# Patient Record
Sex: MALE | Race: WHITE | Employment: FULL TIME | URBAN - METROPOLITAN AREA
[De-identification: names, ages, dates, MRNs, and addresses within clinical notes are randomized per-mention and may not be internally consistent; named-entity substitution may affect disease eponyms.]

---

## 2021-04-02 ENCOUNTER — OFFICE VISIT (OUTPATIENT)
Dept: UROLOGY | Facility: CLINIC | Age: 46
End: 2021-04-02
Payer: COMMERCIAL

## 2021-04-02 VITALS
DIASTOLIC BLOOD PRESSURE: 78 MMHG | SYSTOLIC BLOOD PRESSURE: 122 MMHG | HEART RATE: 78 BPM | WEIGHT: 230 LBS | BODY MASS INDEX: 32.93 KG/M2 | HEIGHT: 70 IN

## 2021-04-02 DIAGNOSIS — R35.0 URINARY FREQUENCY: Primary | ICD-10-CM

## 2021-04-02 DIAGNOSIS — R97.20 ELEVATED PROSTATE SPECIFIC ANTIGEN (PSA): Primary | ICD-10-CM

## 2021-04-02 DIAGNOSIS — R97.20 ELEVATED PROSTATE SPECIFIC ANTIGEN (PSA): ICD-10-CM

## 2021-04-02 LAB
ALBUMIN UR-MCNC: NEGATIVE MG/DL
APPEARANCE UR: CLEAR
BILIRUB UR QL STRIP: NEGATIVE
COLOR UR AUTO: YELLOW
GLUCOSE UR STRIP-MCNC: NEGATIVE MG/DL
HGB UR QL STRIP: NEGATIVE
KETONES UR STRIP-MCNC: NEGATIVE MG/DL
LEUKOCYTE ESTERASE UR QL STRIP: NEGATIVE
NITRATE UR QL: NEGATIVE
PH UR STRIP: 6.5 PH (ref 5–7)
SOURCE: NORMAL
SP GR UR STRIP: >1.03 (ref 1–1.03)
UROBILINOGEN UR STRIP-ACNC: 0.2 EU/DL (ref 0.2–1)

## 2021-04-02 PROCEDURE — 99203 OFFICE O/P NEW LOW 30 MIN: CPT | Mod: 25 | Performed by: STUDENT IN AN ORGANIZED HEALTH CARE EDUCATION/TRAINING PROGRAM

## 2021-04-02 PROCEDURE — 51798 US URINE CAPACITY MEASURE: CPT | Performed by: STUDENT IN AN ORGANIZED HEALTH CARE EDUCATION/TRAINING PROGRAM

## 2021-04-02 PROCEDURE — 81003 URINALYSIS AUTO W/O SCOPE: CPT | Mod: QW | Performed by: STUDENT IN AN ORGANIZED HEALTH CARE EDUCATION/TRAINING PROGRAM

## 2021-04-02 RX ORDER — TAMSULOSIN HYDROCHLORIDE 0.4 MG/1
0.4 CAPSULE ORAL
COMMUNITY
Start: 2021-02-26

## 2021-04-02 RX ORDER — ALLOPURINOL 300 MG/1
600 TABLET ORAL
COMMUNITY
Start: 2021-02-15

## 2021-04-02 SDOH — HEALTH STABILITY: MENTAL HEALTH: HOW OFTEN DO YOU HAVE A DRINK CONTAINING ALCOHOL?: 4 OR MORE TIMES A WEEK

## 2021-04-02 SDOH — HEALTH STABILITY: MENTAL HEALTH: HOW OFTEN DO YOU HAVE 6 OR MORE DRINKS ON ONE OCCASION?: MONTHLY

## 2021-04-02 SDOH — HEALTH STABILITY: MENTAL HEALTH: HOW MANY STANDARD DRINKS CONTAINING ALCOHOL DO YOU HAVE ON A TYPICAL DAY?: 1 OR 2

## 2021-04-02 ASSESSMENT — PAIN SCALES - GENERAL: PAINLEVEL: NO PAIN (0)

## 2021-04-02 ASSESSMENT — MIFFLIN-ST. JEOR: SCORE: 1944.52

## 2021-04-02 NOTE — NURSING NOTE
Was having urinary frequency  And slow stream went to primary . UA was negative so alex a PSA which was elevated . Symptoms have all resolved . Never needed  The Flomax . Symptoms began about a month after his vasectomy .  Reyna Covington LPN

## 2021-04-02 NOTE — PROGRESS NOTES
[facility]  Main Office: [address]  [address]  [place] [address]       CHIEF COMPLAINT:  LUTS, elevated PSA    HISTORY:   46 yo M with urinary frequency, elevated PSA    Seen 2/23/2021 by PCP for urinary symptoms including increased urinary frequency. A month prior to that he had a vasectomy performed. A urinalysis at that time showed 11-25 wbc/hpf, 3-5 rbc/hpf, moderate bacteria. Nitrite negative. PSA was sent which was elevated to 9.43 ng/dl. Was sent a script for flomax but never filled it.    Today his urinalysis is unremarkable. Urinary symptoms resolved about 3 days after that visit. Never had a UTI before.     No FH of prostate cancer, bladder cancer, testicular cancer, kidney cancer.    Never smoker    PAST MEDICAL HISTORY: No past medical history on file.  Otherwise healthy    PAST SURGICAL HISTORY: No past surgical history on file.  No surgical history    FAMILY HISTORY: No family history on file.    SOCIAL HISTORY:   Social History     Tobacco Use     Smoking status: Former Smoker     Types: Cigarettes     Smokeless tobacco: Never Used   Substance Use Topics     Alcohol use: Yes     Alcohol/week: 16.0 standard drinks     Types: 16 Cans of beer per week     Frequency: 4 or more times a week     Drinks per session: 1 or 2     Binge frequency: Monthly        No Known Allergies      Current Outpatient Medications:      allopurinol (ZYLOPRIM) 300 MG tablet, Take 600 mg by mouth, Disp: , Rfl:      tamsulosin (FLOMAX) 0.4 MG capsule, Take 0.4 mg by mouth, Disp: , Rfl:     Review Of Systems:  Skin: No rash, pruritis, or skin pigmentation  Eyes: No changes in vision  Ears/Nose/Throat: No changes in hearing, no nosebleeds  Respiratory: No shortness of breath, dyspnea on exertion, cough, or hemoptysis  Cardiovascular: No chest pain or palpitations  Gastrointestinal: No diarrhea or constipation. No abdominal pain. No hematochezia  Genitourinary: see HPI  Musculoskeletal: No pain or swelling of  "joints, normal range of motion  Neurologic: No weakness or tremors  Psychiatric: No recent changes in memory or mood  Hematologic/Lymphatic/Immunologic: No easy bruising or enlarged lymph nodes  Endocrine: No weight gain or loss      PHYSICAL EXAM:    /78   Pulse 78   Ht 1.778 m (5' 10\")   Wt 104.3 kg (230 lb)   BMI 33.00 kg/m    General appearance: In NAD, conversant  HEENT: Normocephalic and atraumatic, anicteric sclera  Cardiovascular: Not examined  Respiratory: normal, non-labored breathing  Gastrointestinal: negative, Abdomen soft, non-tender, and non-distended.   Musculoskeletal: Not Examined  Peripheral Vascular/extremity: No peripheral edema  Skin: Normal temperature, turgor, and texture. No rash  Psychiatric: Appropriate affect, alert and oriented to person, place, and time    Penis: circ phallus, orthotopic and patent meatus  Scrotal Skin: normal. Well healed vasectomy incision   Testicles: Normal bilat  Epididymis: Normal bilat  Digital Rectal Exam: ~30 gram prostate, soft, symmetric, anodular, nontender    Cystoscopy: Not done      PSA: 9.43 ng/dl    UA RESULTS:  Recent Labs   Lab Test 04/02/21  1404   COLOR Yellow   APPEARANCE Clear   URINEGLC Negative   URINEBILI Negative   URINEKETONE Negative   SG >1.030   UBLD Negative   URINEPH 6.5   PROTEIN Negative   UROBILINOGEN 0.2   NITRITE Negative   LEUKEST Negative       Bladder Scan:     Other Labs:      Imaging Studies: None      CLINICAL IMPRESSION:   46 yo M with urinary frequency, elevated PSA. Possible that his PSA up to 9.43 ng/ml was falsely elevated due to inflammation/prostatitis at that time since he was symptomatic with frequency, and UA showed pyuria and moderate bacteria.    PLAN:   Recheck PSA in 3-4 weeks    Virtual visit afterwards to follow up    Mil Wilkes MD   [facility]  [phone] clinic phone    "

## 2021-04-02 NOTE — LETTER
Date:May 1, 2021      Patient was self referred, no letter generated. Do not send.        [facility] Health Information       SOB

## 2021-04-02 NOTE — Clinical Note
4/2/2021       RE: Mil Chilel  [address] [facility] [address]     Dear Colleague,    Thank you for referring your patient, Mil Chilel, to the [facility] [place] at [facility]. Please see a copy of my visit note below.    [facility]  Main Office: [address]  [address]  [place] [address]       CHIEF COMPLAINT:      HISTORY:   ***    Seen 2/23/2021 by PCP for urinary symptoms including increased urinary frequency. A month prior to that he had a vasectomy performed. A urinalysis at that time showed 11-25 wbc/hpf, 3-5 rbc/hpf, moderate bacteria. Nitrite negative. PSA was sent which was elevated to 9.43 ng/dl. Was sent a script for flomax but never filled it.    Today his urinalysis is unremarkable. Urinary symptoms resolved about 3 days after that visit. Never had a UTI before.     No FH of prostate cancer, bladder cancer, testicular cancer, kidney cancer.    Never smoker    PAST MEDICAL HISTORY: No past medical history on file.  Otherwise healthy    PAST SURGICAL HISTORY: No past surgical history on file.  No surgical history    FAMILY HISTORY: No family history on file.    SOCIAL HISTORY:   Social History     Tobacco Use     Smoking status: Former Smoker     Types: Cigarettes     Smokeless tobacco: Never Used   Substance Use Topics     Alcohol use: Yes     Alcohol/week: 16.0 standard drinks     Types: 16 Cans of beer per week     Frequency: 4 or more times a week     Drinks per session: 1 or 2     Binge frequency: Monthly        No Known Allergies      Current Outpatient Medications:      allopurinol (ZYLOPRIM) 300 MG tablet, Take 600 mg by mouth, Disp: , Rfl:      tamsulosin (FLOMAX) 0.4 MG capsule, Take 0.4 mg by mouth, Disp: , Rfl:     Review Of Systems:  Skin: No rash, pruritis, or skin pigmentation  Eyes: No changes in vision  Ears/Nose/Throat: No changes in hearing, no nosebleeds  Respiratory: No shortness  "of breath, dyspnea on exertion, cough, or hemoptysis  Cardiovascular: No chest pain or palpitations  Gastrointestinal: No diarrhea or constipation. No abdominal pain. No hematochezia  Genitourinary: see HPI  Musculoskeletal: No pain or swelling of joints, normal range of motion  Neurologic: No weakness or tremors  Psychiatric: No recent changes in memory or mood  Hematologic/Lymphatic/Immunologic: No easy bruising or enlarged lymph nodes  Endocrine: No weight gain or loss      PHYSICAL EXAM:    /78   Pulse 78   Ht 1.778 m (5' 10\")   Wt 104.3 kg (230 lb)   BMI 33.00 kg/m    General appearance: In NAD, conversant  HEENT: Normocephalic and atraumatic, anicteric sclera  Cardiovascular: Not examined  Respiratory: normal, non-labored breathing  Gastrointestinal: negative, Abdomen soft, non-tender, and non-distended.   Musculoskeletal: Not Examined  Peripheral Vascular/extremity: No peripheral edema  Skin: Normal temperature, turgor, and texture. No rash  Psychiatric: Appropriate affect, alert and oriented to person, place, and time    Penis: circ phallus, orthotopic and patent meatus  Scrotal Skin: normal. Well healed vasectomy incision   Testicles: Normal bilat  Epididymis: Normal bilat  Digital Rectal Exam: ~30 gram prostate, soft, symmetric, anodular, nontender    Cystoscopy: Not done      PSA: 9.43 ng/dl    UA RESULTS:  Recent Labs   Lab Test 04/02/21  1404   COLOR Yellow   APPEARANCE Clear   URINEGLC Negative   URINEBILI Negative   URINEKETONE Negative   SG >1.030   UBLD Negative   URINEPH 6.5   PROTEIN Negative   UROBILINOGEN 0.2   NITRITE Negative   LEUKEST Negative       Bladder Scan:     Other Labs:      Imaging Studies: None      CLINICAL IMPRESSION:   ***    PLAN:   ***    Mil Wilkes MD   [facility]  [phone] clinic phone        Again, thank you for allowing me to participate in the care of your patient.      Sincerely,    Mil Wilkes MD    "

## 2021-04-28 DIAGNOSIS — R97.20 ELEVATED PROSTATE SPECIFIC ANTIGEN (PSA): ICD-10-CM

## 2021-04-28 PROCEDURE — 84153 ASSAY OF PSA TOTAL: CPT | Performed by: STUDENT IN AN ORGANIZED HEALTH CARE EDUCATION/TRAINING PROGRAM

## 2021-04-28 PROCEDURE — 36415 COLL VENOUS BLD VENIPUNCTURE: CPT | Performed by: STUDENT IN AN ORGANIZED HEALTH CARE EDUCATION/TRAINING PROGRAM

## 2021-04-29 LAB — PSA SERPL-MCNC: 1.94 UG/L (ref 0–4)

## 2021-05-04 ENCOUNTER — VIRTUAL VISIT (OUTPATIENT)
Dept: UROLOGY | Facility: CLINIC | Age: 46
End: 2021-05-04
Payer: COMMERCIAL

## 2021-05-04 VITALS — HEIGHT: 70 IN | BODY MASS INDEX: 32.93 KG/M2 | WEIGHT: 230 LBS

## 2021-05-04 DIAGNOSIS — R97.20 ELEVATED PROSTATE SPECIFIC ANTIGEN (PSA): Primary | ICD-10-CM

## 2021-05-04 PROCEDURE — 99213 OFFICE O/P EST LOW 20 MIN: CPT | Mod: 95 | Performed by: PHYSICIAN ASSISTANT

## 2021-05-04 ASSESSMENT — ENCOUNTER SYMPTOMS
HEMATURIA: 0
CHILLS: 0
DIFFICULTY URINATING: 0
FEVER: 0
FREQUENCY: 0
DYSURIA: 0

## 2021-05-04 ASSESSMENT — PAIN SCALES - GENERAL: PAINLEVEL: MODERATE PAIN (4)

## 2021-05-04 ASSESSMENT — MIFFLIN-ST. JEOR: SCORE: 1944.52

## 2021-05-04 NOTE — LETTER
5/4/2021       RE: Mil Chilel  [address] [facility] [address]     Dear Colleague,    Thank you for referring your patient, Mil Chilel, to the [facility] [place] at [facility]. Please see a copy of my visit note below.    *SEND LINK TO CELL PHONE*    PT NOT TAKING FLOMAX.  PT ON AUGMENTIN FOR TOOTH EXTRACTION.    Mil is a 45 year old who is being evaluated via a billable video visit.      How would you like to obtain your AVS? Mail a copy  If the video visit is dropped, the invitation should be resent by: Text to cell phone: [phone]  Will anyone else be joining your video visit? No      Video-Visit Details    Type of service:  Video Visit    Video Start Time: 1250    Video End Time:1257    Originating Location (pt. Location): Home    Distant Location (provider location):  [facility] [place]     Platform used for Video Visit: [facility]     CHIEF COMPLAINT/REASON FOR VISIT   Elevated PSA follow up    HISTORY OF PRESENT ILLNESS   Mr. Chilel is a very pleasant 45-year-old gentleman, who presents today for follow-up regarding elevated PSA.  He was seen by Dr. Wilkes on 04/02/21 for elevated PSA and lower urinary tract symptoms.  He was initially seen by his PCP on 02/23/21 for urinary symptoms including increased urinary frequency.  A month prior to that he had had a vasectomy.  Patient was given a prescription for Flomax, but he did not fill it and has not taken it.  He notes that several days after he saw his PCP, his urinary symptoms resolved.    PSA at that time was found to be elevated at 4.43.  Digital rectal examination with Dr. Wilkes showed a lightly enlarged prostate at 30 g with no concerning findings.  Recommendation was made for follow-up PSA in 3 to 4 weeks and return visit.  No family history of prostate cancer. Patient presents today for this.    He is not taking any Flomax.  He does note  that he started Augmentin for a tooth extraction the same day his blood was drawn for the repeat PSA.  He denies any difficulties with urination including hematuria, dysuria, urgency, frequency, urinary incontinence.  He does note that when he saw his PCP, he also was having fevers, chills, and sweats in conjunction with difficulties with urination.    The following portions of the patient's history were reviewed and updated as appropriate: allergies, current medications, past family history, past medical history, past social history, past surgical history, and problem list.     REVIEW OF SYSTEMS   Review of Systems   Constitutional: Negative for chills and fever.   Genitourinary: Negative for difficulty urinating, dysuria, frequency and hematuria.      Per HPI.     There is no problem list on file for this patient.     Past Medical History:   Diagnosis Date     Epididymitis, bilateral      Epididymitis, left      Epididymitis, right      Gout       Objective      PHYSICAL EXAM   GENERAL: Healthy, alert and no distress  EYES: Eyes grossly normal to inspection.  No discharge or erythema, or obvious scleral/conjunctival abnormalities.  HENT: Normal cephalic/atraumatic.  External ears, nose and mouth without ulcers or lesions.  No nasal drainage visible.  NECK: No asymmetry, visible masses or scars  RESP: No audible wheeze, cough, or visible cyanosis.  No visible retractions or increased work of breathing.    MS: No gross musculoskeletal defects noted.  Normal range of motion.  No visible edema.  SKIN: Visible skin clear. No significant rash, abnormal pigmentation or lesions.  NEURO: Cranial nerves grossly intact.  Mentation and speech appropriate for age.  PSYCH: Mentation appears normal, affect normal/bright, judgement and insight intact, normal speech and appearance well-groomed.     LABORATORY     Recent Labs   Lab Test 04/02/21  1404   COLOR Yellow   APPEARANCE Clear   URINEGLC Negative   URINEBILI Negative    URINEKETONE Negative   SG >1.030   UBLD Negative   URINEPH 6.5   PROTEIN Negative   UROBILINOGEN 0.2   NITRITE Negative   LEUKEST Negative     PSA 1.94 on 04/28/21.    Assessment & Plan    1. Elevated prostate specific antigen (PSA)      I had the pleasure today meeting with Mr. Chilel to discuss his previously elevated PSA.  PSA has decreased to 1.94.  This is within normal limits for his age, but is getting towards the higher end of an age adjusted PSA.    We discussed that his symptomatology could be due to a possible urinary tract infection or prostate infection, or a degree of inflammation that resolved.  We discussed elevated PSA can be due to multiple things including infection, inflammation, prostate cancer, a particularly large gland, recent sexual activity or bouncing activities such as riding a bicycle or lawnmower.    Given his previous PSA elevation and difficulties burning urination, I would recommend an additional follow-up to ensure that PSA is not rising again.    -Follow-up in 6 months with a repeat PSA.  -Return visit to discuss.  -If within normal limits, patient will likely be able to be seen on an as needed basis and urology.    All questions and concerns answered at this time. He should contact us with questions, concerns, or changes in symptomatology.    Signed by:       Sada Mckeon PA-C 5/4/2021 3:05 PM     I spent a total of 16 minutes obtaining the HPI, examining the patient, documentation, counseling the patient on diagnosis and treatment on the day of the visit.

## 2021-05-04 NOTE — PATIENT INSTRUCTIONS
-Follow-up in 6 months with a repeat PSA.  -Return visit to discuss.  -If within normal limits, you will likely be able to be seen on an as needed basis and urology.

## 2021-05-04 NOTE — PROGRESS NOTES
*SEND LINK TO CELL PHONE*    PT NOT TAKING FLOMAX.  PT ON AUGMENTIN FOR TOOTH EXTRACTION.    Mil is a 45 year old who is being evaluated via a billable video visit.      How would you like to obtain your AVS? Mail a copy  If the video visit is dropped, the invitation should be resent by: Text to cell phone: [phone]  Will anyone else be joining your video visit? No      Video-Visit Details    Type of service:  Video Visit    Video Start Time: 1250    Video End Time:1257    Originating Location (pt. Location): Home    Distant Location (provider location):  [facility] [place]     Platform used for Video Visit: [employer]OMET[employer]     CHIEF COMPLAINT/REASON FOR VISIT   Elevated PSA follow up    HISTORY OF PRESENT ILLNESS   Mr. Chilel is a very pleasant 45-year-old gentleman, who presents today for follow-up regarding elevated PSA.  He was seen by Dr. Wilkes on 04/02/21 for elevated PSA and lower urinary tract symptoms.  He was initially seen by his PCP on 02/23/21 for urinary symptoms including increased urinary frequency.  A month prior to that he had had a vasectomy.  Patient was given a prescription for Flomax, but he did not fill it and has not taken it.  He notes that several days after he saw his PCP, his urinary symptoms resolved.    PSA at that time was found to be elevated at 4.43.  Digital rectal examination with Dr. Wilkes showed a lightly enlarged prostate at 30 g with no concerning findings.  Recommendation was made for follow-up PSA in 3 to 4 weeks and return visit.  No family history of prostate cancer. Patient presents today for this.    He is not taking any Flomax.  He does note that he started Augmentin for a tooth extraction the same day his blood was drawn for the repeat PSA.  He denies any difficulties with urination including hematuria, dysuria, urgency, frequency, urinary incontinence.  He does note that when he saw his PCP, he also was having fevers, chills, and sweats in  conjunction with difficulties with urination.    The following portions of the patient's history were reviewed and updated as appropriate: allergies, current medications, past family history, past medical history, past social history, past surgical history, and problem list.     REVIEW OF SYSTEMS   Review of Systems   Constitutional: Negative for chills and fever.   Genitourinary: Negative for difficulty urinating, dysuria, frequency and hematuria.      Per HPI.     There is no problem list on file for this patient.     Past Medical History:   Diagnosis Date     Epididymitis, bilateral      Epididymitis, left      Epididymitis, right      Gout       Objective      PHYSICAL EXAM   GENERAL: Healthy, alert and no distress  EYES: Eyes grossly normal to inspection.  No discharge or erythema, or obvious scleral/conjunctival abnormalities.  HENT: Normal cephalic/atraumatic.  External ears, nose and mouth without ulcers or lesions.  No nasal drainage visible.  NECK: No asymmetry, visible masses or scars  RESP: No audible wheeze, cough, or visible cyanosis.  No visible retractions or increased work of breathing.    MS: No gross musculoskeletal defects noted.  Normal range of motion.  No visible edema.  SKIN: Visible skin clear. No significant rash, abnormal pigmentation or lesions.  NEURO: Cranial nerves grossly intact.  Mentation and speech appropriate for age.  PSYCH: Mentation appears normal, affect normal/bright, judgement and insight intact, normal speech and appearance well-groomed.     LABORATORY     Recent Labs   Lab Test 04/02/21  1404   COLOR Yellow   APPEARANCE Clear   URINEGLC Negative   URINEBILI Negative   URINEKETONE Negative   SG >1.030   UBLD Negative   URINEPH 6.5   PROTEIN Negative   UROBILINOGEN 0.2   NITRITE Negative   LEUKEST Negative     PSA 1.94 on 04/28/21.    Assessment & Plan    1. Elevated prostate specific antigen (PSA)      I had the pleasure today meeting with Mr. Chilel to discuss his previously  elevated PSA.  PSA has decreased to 1.94.  This is within normal limits for his age, but is getting towards the higher end of an age adjusted PSA.    We discussed that his symptomatology could be due to a possible urinary tract infection or prostate infection, or a degree of inflammation that resolved.  We discussed elevated PSA can be due to multiple things including infection, inflammation, prostate cancer, a particularly large gland, recent sexual activity or bouncing activities such as riding a bicycle or lawnmower.    Given his previous PSA elevation and difficulties burning urination, I would recommend an additional follow-up to ensure that PSA is not rising again.    -Follow-up in 6 months with a repeat PSA.  -Return visit to discuss.  -If within normal limits, patient will likely be able to be seen on an as needed basis and urology.    All questions and concerns answered at this time. He should contact us with questions, concerns, or changes in symptomatology.    Signed by:       Sada Mckeon PA-C 5/4/2021 3:05 PM     I spent a total of 16 minutes obtaining the HPI, examining the patient, documentation, counseling the patient on diagnosis and treatment on the day of the visit.